# Patient Record
Sex: FEMALE
[De-identification: names, ages, dates, MRNs, and addresses within clinical notes are randomized per-mention and may not be internally consistent; named-entity substitution may affect disease eponyms.]

---

## 2021-10-28 ENCOUNTER — APPOINTMENT (OUTPATIENT)
Dept: HEART AND VASCULAR | Facility: CLINIC | Age: 66
End: 2021-10-28
Payer: COMMERCIAL

## 2021-10-28 ENCOUNTER — NON-APPOINTMENT (OUTPATIENT)
Age: 66
End: 2021-10-28

## 2021-10-28 VITALS
WEIGHT: 115 LBS | SYSTOLIC BLOOD PRESSURE: 110 MMHG | BODY MASS INDEX: 21.71 KG/M2 | HEART RATE: 51 BPM | DIASTOLIC BLOOD PRESSURE: 60 MMHG | HEIGHT: 61 IN

## 2021-10-28 DIAGNOSIS — R07.89 OTHER CHEST PAIN: ICD-10-CM

## 2021-10-28 DIAGNOSIS — E78.5 HYPERLIPIDEMIA, UNSPECIFIED: ICD-10-CM

## 2021-10-28 DIAGNOSIS — Z82.49 FAMILY HISTORY OF ISCHEMIC HEART DISEASE AND OTHER DISEASES OF THE CIRCULATORY SYSTEM: ICD-10-CM

## 2021-10-28 DIAGNOSIS — Z00.00 ENCOUNTER FOR GENERAL ADULT MEDICAL EXAMINATION W/OUT ABNORMAL FINDINGS: ICD-10-CM

## 2021-10-28 PROCEDURE — 99204 OFFICE O/P NEW MOD 45 MIN: CPT

## 2021-10-28 PROCEDURE — 93306 TTE W/DOPPLER COMPLETE: CPT

## 2021-10-28 PROCEDURE — 93000 ELECTROCARDIOGRAM COMPLETE: CPT

## 2021-10-28 NOTE — HISTORY OF PRESENT ILLNESS
[FreeTextEntry1] : 67 yo female no PMH seen by PMD and noted to have elevated LDL of 176 and HDL of 60\par No fam hx of CAD or CVA ... menopause age 51\par Was also noted to have non specific st chnages on ekg\par Is very active with no chest pain or sob

## 2021-10-28 NOTE — ASSESSMENT
[FreeTextEntry1] : Lvef 55% mild MR \par NSr non specific repol abnl\par Current 10 yr risk for CV event is 8.1% (low),\par will ask to get Calcium score to assess how aggressive we need to be with LDL management \par will go on low cholesterol, diet and see if LDL falls \par overall CV risk is low